# Patient Record
Sex: FEMALE | URBAN - METROPOLITAN AREA
[De-identification: names, ages, dates, MRNs, and addresses within clinical notes are randomized per-mention and may not be internally consistent; named-entity substitution may affect disease eponyms.]

---

## 2023-01-04 ENCOUNTER — TELEPHONE (OUTPATIENT)
Dept: PRIMARY CARE CLINIC | Facility: CLINIC | Age: 38
End: 2023-01-04

## 2023-01-04 DIAGNOSIS — F41.8 MIXED ANXIETY AND DEPRESSIVE DISORDER: Primary | ICD-10-CM

## 2023-01-04 RX ORDER — DULOXETIN HYDROCHLORIDE 60 MG/1
60 CAPSULE, DELAYED RELEASE ORAL DAILY
Qty: 30 CAPSULE | Refills: 5 | Status: SHIPPED | OUTPATIENT
Start: 2023-01-04 | End: 2023-07-11

## 2023-01-04 RX ORDER — DULOXETIN HYDROCHLORIDE 60 MG/1
60 CAPSULE, DELAYED RELEASE ORAL DAILY
COMMUNITY
End: 2023-01-04 | Stop reason: SDUPTHER

## 2023-01-04 NOTE — TELEPHONE ENCOUNTER
----- Message from Karis Humphries sent at 1/4/2023  7:32 AM CST -----  Type:  RX Refill Request    Who Called: patient  Refill or New Rx:refill  RX Name and Strength:Cymbalta   How is the patient currently taking it? (ex. 1XDay):na  Is this a 30 day or 90 day RX:na  Preferred Pharmacy with phone number:[facility] [address]  Local or Mail Order:local  Ordering Provider:Dr Flores  Would the patient rather a call back or a response via [facility]? Call back  Best Call Back Number:[phone]  Additional Information: sylvester

## 2023-03-15 ENCOUNTER — TELEPHONE (OUTPATIENT)
Dept: PRIMARY CARE CLINIC | Facility: CLINIC | Age: 38
End: 2023-03-15
Payer: COMMERCIAL

## 2023-03-15 NOTE — TELEPHONE ENCOUNTER
----- Message from Byron Rasheed sent at 3/14/2023  1:26 PM CDT -----  Regarding: Patient advice  Contact: Pt  Pt called in regards to getting an medication refill     Pt can be reached at [phone]

## 2023-03-21 ENCOUNTER — TELEPHONE (OUTPATIENT)
Dept: PRIMARY CARE CLINIC | Facility: CLINIC | Age: 38
End: 2023-03-21
Payer: COMMERCIAL

## 2023-03-21 NOTE — TELEPHONE ENCOUNTER
----- Message from Tata Epstein sent at 3/21/2023  8:31 AM CDT -----  Contact: Lida  Type:  RX Refill Request    Who Called: Lida  Refill or New Rx:refill  RX Name and Strength:metoperol 50 mg  How is the patient currently taking it? (ex. 1XDay):as prescribed  Is this a 30 day or 90 day RX:30 day  Preferred Pharmacy with phone number:  [facility] #[id number] - [place], [place] - [address] AT [facility] [address]  [address] [place] [address]  [address]  [place] [address]  Phone: [phone] Fax: [phone]  Local or Mail Order:local  Ordering Provider:flood  Would the patient rather a call back or a response via [facility]? Sandeesmaykel  Best Call Back Number:n/a  Additional Information: n/a      Who Called: Lida  Refill or New Rx:refill  RX Name and Strength:njpfapka27 mg  How is the patient currently taking it? (ex. 1XDay):as prescribed  Is this a 30 day or 90 day RX:30 day  Preferred Pharmacy with phone number:  [facility] #[id number]  [place] - [address] AT [facility] [address]  [address] [place] [address]  [address]  [place] [address]  Phone: [phone] Fax: [phone]  Local or Mail Order:local  Ordering Provider:flood  Would the patient rather a call back or a response via [facility]? [facility]  Best Call Back Number:n/a  Additional Information: n/a    Who Called: Lida  Refill or New Rx:refill  RX Name and Strength:xanax 10 mg  How is the patient currently taking it? (ex. 1XDay):as prescribed  Is this a 30 day or 90 day RX:30 day  Preferred Pharmacy with phone number:  [facility] #[id number] - [place], [place] - [address] AT [facility] [address]  [address] [place] [address]  [address]  [facility] [address]  Phone: [phone] Fax: [phone]  Local or Mail Order:local  Ordering Provider:flood  Would the patient [facility] a call back or a [facility]  Best Call Back Number:n/a  Additional Information: n/a      Thanks  TS

## 2023-03-27 ENCOUNTER — TELEPHONE (OUTPATIENT)
Dept: PRIMARY CARE CLINIC | Facility: CLINIC | Age: 38
End: 2023-03-27
Payer: COMMERCIAL

## 2023-03-27 RX ORDER — METOPROLOL SUCCINATE 50 MG/1
50 TABLET, EXTENDED RELEASE ORAL NIGHTLY
Qty: 30 TABLET | Refills: 5 | Status: SHIPPED | OUTPATIENT
Start: 2023-03-27 | End: 2023-11-09 | Stop reason: SDUPTHER

## 2023-03-27 RX ORDER — DEXTROAMPHETAMINE SACCHARATE, AMPHETAMINE ASPARTATE, DEXTROAMPHETAMINE SULFATE AND AMPHETAMINE SULFATE 2.5; 2.5; 2.5; 2.5 MG/1; MG/1; MG/1; MG/1
1 TABLET ORAL EVERY MORNING
OUTPATIENT
Start: 2023-03-27

## 2023-03-27 RX ORDER — DEXTROAMPHETAMINE SACCHARATE, AMPHETAMINE ASPARTATE, DEXTROAMPHETAMINE SULFATE AND AMPHETAMINE SULFATE 2.5; 2.5; 2.5; 2.5 MG/1; MG/1; MG/1; MG/1
1 TABLET ORAL EVERY MORNING
COMMUNITY
Start: 2022-12-02 | End: 2023-03-27

## 2023-03-27 RX ORDER — ALPRAZOLAM 0.5 MG/1
0.5 TABLET ORAL DAILY PRN
Qty: 30 TABLET | Refills: 1 | Status: SHIPPED | OUTPATIENT
Start: 2023-03-27

## 2023-03-27 RX ORDER — DEXTROAMPHETAMINE SACCHARATE, AMPHETAMINE ASPARTATE, DEXTROAMPHETAMINE SULFATE AND AMPHETAMINE SULFATE 2.5; 2.5; 2.5; 2.5 MG/1; MG/1; MG/1; MG/1
1 TABLET ORAL DAILY
Qty: 30 TABLET | Refills: 0 | Status: SHIPPED | OUTPATIENT
Start: 2023-05-26 | End: 2023-06-25

## 2023-03-27 RX ORDER — DEXTROAMPHETAMINE SACCHARATE, AMPHETAMINE ASPARTATE, DEXTROAMPHETAMINE SULFATE AND AMPHETAMINE SULFATE 2.5; 2.5; 2.5; 2.5 MG/1; MG/1; MG/1; MG/1
1 TABLET ORAL DAILY
Qty: 30 TABLET | Refills: 0 | Status: SHIPPED | OUTPATIENT
Start: 2023-03-27 | End: 2023-04-26

## 2023-03-27 RX ORDER — DEXTROAMPHETAMINE SACCHARATE, AMPHETAMINE ASPARTATE, DEXTROAMPHETAMINE SULFATE AND AMPHETAMINE SULFATE 2.5; 2.5; 2.5; 2.5 MG/1; MG/1; MG/1; MG/1
1 TABLET ORAL DAILY
Qty: 30 TABLET | Refills: 0 | Status: SHIPPED | OUTPATIENT
Start: 2023-04-26 | End: 2023-05-26

## 2023-03-27 RX ORDER — ALPRAZOLAM 0.5 MG/1
0.5 TABLET ORAL DAILY PRN
COMMUNITY
Start: 2022-11-16 | End: 2023-03-27 | Stop reason: SDUPTHER

## 2023-03-27 RX ORDER — METOPROLOL SUCCINATE 50 MG/1
50 TABLET, EXTENDED RELEASE ORAL NIGHTLY
COMMUNITY
Start: 2022-11-12 | End: 2023-03-27 | Stop reason: SDUPTHER

## 2023-03-27 NOTE — TELEPHONE ENCOUNTER
----- Message from Carline Washington sent at 3/27/2023 12:06 PM CDT -----  Contact: Lida  Patient is calling to speak with nurse regarding medication. Reports being a former pt of Dr lu and needing multiple medications refills, but her information hasn't been transferred into the system here at [facility]. Please return patients call at .[phone].

## 2023-03-27 NOTE — TELEPHONE ENCOUNTER
----- Message from Gena Ventura sent at 3/27/2023  2:30 PM CDT -----  Contact: Lida  Patient returning call about medications, stated she doesn't have any of her medications. Please call her back at [phone]

## 2023-07-11 DIAGNOSIS — F41.8 MIXED ANXIETY AND DEPRESSIVE DISORDER: ICD-10-CM

## 2023-07-11 RX ORDER — DULOXETIN HYDROCHLORIDE 60 MG/1
CAPSULE, DELAYED RELEASE ORAL
Qty: 30 CAPSULE | Refills: 5 | Status: SHIPPED | OUTPATIENT
Start: 2023-07-11

## 2023-09-21 RX ORDER — METOPROLOL SUCCINATE 50 MG/1
50 TABLET, EXTENDED RELEASE ORAL NIGHTLY
Qty: 30 TABLET | Refills: 5 | OUTPATIENT
Start: 2023-09-21

## 2023-09-22 ENCOUNTER — TELEPHONE (OUTPATIENT)
Dept: PRIMARY CARE CLINIC | Facility: CLINIC | Age: 38
End: 2023-09-22
Payer: COMMERCIAL

## 2023-09-22 DIAGNOSIS — K92.1 BLOODY STOOL: Primary | ICD-10-CM

## 2023-09-22 NOTE — TELEPHONE ENCOUNTER
----- Message from Lamar Montoya sent at 9/22/2023 12:42 PM CDT -----  Type: Appointment Request     Name of Caller: QUIN GTZ [[id number]]    When is the first available appointment? Do not have access    Reason for Visit:  bleeding in bowel/ refill on medication     Best Call Back Number: [phone]     Additional Information: Patient states that it is getting worse and would like to know if a referral can be up in for her to see Gastroenterology to get a colonoscopy

## 2023-11-09 DIAGNOSIS — I48.91 ATRIAL FIBRILLATION, UNSPECIFIED TYPE: Primary | ICD-10-CM

## 2023-11-09 RX ORDER — METOPROLOL SUCCINATE 50 MG/1
50 TABLET, EXTENDED RELEASE ORAL NIGHTLY
Qty: 30 TABLET | Refills: 5 | Status: SHIPPED | OUTPATIENT
Start: 2023-11-09

## 2023-11-09 NOTE — TELEPHONE ENCOUNTER
----- Message from Mary Bam sent at 11/9/2023  8:32 AM CST -----  Name of Who is Calling:Patient           What is the request in detail:Patient is requesting a refill on medication       metoprolol succinate (TOPROL-XL) 50 MG 24 hr tablet 30 tablet 5 3/27/2023 - No  Sig - Route: Take 1 tablet (50 mg total) by mouth every evening. - Oral  Sent to pharmacy as: metoprolol succinate (TOPROL-XL) 50 MG 24 hr tablet  Class: Normal  Notes to Pharmacy: .  Order: [id number]  Date/Time Signed: 3/27/2023 16:01      E-Prescribing Status: Receipt confirmed by pharmacy (3/27/2023  4:01 PM CDT)      [id number] - [place] [id number] [place] [address] AT [facility] [address] [place] [address]  [address] [address]  Phone: [phone] Fax: [phone]        Can the clinic reply by [facility]:no           What Number to Call Back if not in [facility]: [phone]

## 2024-02-08 DIAGNOSIS — I48.91 ATRIAL FIBRILLATION, UNSPECIFIED TYPE: ICD-10-CM

## 2024-02-08 RX ORDER — METOPROLOL SUCCINATE 50 MG/1
50 TABLET, EXTENDED RELEASE ORAL NIGHTLY
Qty: 30 TABLET | Refills: 5 | OUTPATIENT
Start: 2024-02-08